# Patient Record
Sex: MALE | URBAN - METROPOLITAN AREA
[De-identification: names, ages, dates, MRNs, and addresses within clinical notes are randomized per-mention and may not be internally consistent; named-entity substitution may affect disease eponyms.]

---

## 2019-08-27 ENCOUNTER — OFFICE VISIT (OUTPATIENT)
Dept: FAMILY MEDICINE CLINIC | Age: 34
End: 2019-08-27

## 2019-08-27 VITALS
RESPIRATION RATE: 18 BRPM | TEMPERATURE: 97.3 F | HEART RATE: 96 BPM | HEIGHT: 65 IN | WEIGHT: 171 LBS | SYSTOLIC BLOOD PRESSURE: 111 MMHG | DIASTOLIC BLOOD PRESSURE: 73 MMHG | BODY MASS INDEX: 28.49 KG/M2 | OXYGEN SATURATION: 97 %

## 2019-08-27 DIAGNOSIS — Z79.4 DIABETES MELLITUS, TYPE II, INSULIN DEPENDENT (HCC): Primary | ICD-10-CM

## 2019-08-27 DIAGNOSIS — E11.9 DIABETES MELLITUS, TYPE II, INSULIN DEPENDENT (HCC): Primary | ICD-10-CM

## 2019-08-27 RX ORDER — METFORMIN HYDROCHLORIDE 1000 MG/1
1000 TABLET ORAL 2 TIMES DAILY WITH MEALS
COMMUNITY
End: 2019-08-27 | Stop reason: SDUPTHER

## 2019-08-27 RX ORDER — LISINOPRIL 10 MG/1
10 TABLET ORAL DAILY
Qty: 90 TAB | Refills: 1 | Status: CANCELLED | OUTPATIENT
Start: 2019-08-27

## 2019-08-27 RX ORDER — LISINOPRIL 10 MG/1
TABLET ORAL DAILY
COMMUNITY
End: 2019-08-27

## 2019-08-27 RX ORDER — METFORMIN HYDROCHLORIDE 1000 MG/1
1000 TABLET ORAL 2 TIMES DAILY WITH MEALS
Qty: 180 TAB | Refills: 1 | Status: SHIPPED | OUTPATIENT
Start: 2019-08-27

## 2019-08-27 NOTE — PROGRESS NOTES
[address] [facility], [place], operated by [facility]    CC: EC for DMII    HPI:     DMII:  -Was diagnosed around 10 years ago  -Reports having had associated symptoms of fatigue, palpitations, polyuria, and polydipsia  -States that at that time he had increased his soda intake due to increase of thirst  -Eventually it was determined he had diabetes  -Reports that his control of diabetes fluctuates from being well controlled to poorly controlled  -Has been trying to follow a low sugar diet  -Reports that he is only taking Novolin N (15 units twice daily)  -Has only been checking his blood sugar at night. No log brought in for review  -Reports his blood sugar at night has been in 200's to 250's  -Reports that he used to also be on metformin (1000 mg BID) and short-acting insulin, but he stopped taking these medications as his prescriptions had ran out  -States it has been over 1 year since he has had any medical care  -Denies any side effects from his diabetic medications  -Does not follow a regular exercise regimen       ROS: Positive items marked in RED  CON: fever, chills  Cardiovascular: palpitations, CP  Resp: SOB, cough  GI: nausea, vomiting, diarrhea  : dysuria, hematuria      Past Medical History:   Diagnosis Date    [facility]        History reviewed. No pertinent surgical history. Family History   Problem Relation Age of Onset    Diabetes Mother     Hypertension Mother     High Cholesterol Mother     Diabetes Father     Hypertension Father     High Cholesterol Father        Social History     Socioeconomic History    Marital status:      Spouse name: Not on file    Number of children: Not on file    Years of education: Not on file    Highest education level: Not on file   Tobacco Use    Smoking status: Current Every Day Smoker     Types: Cigarettes   Substance and Sexual Activity    Alcohol use:  Yes    Sexual activity: Yes     Partners: Female       No Known Allergies      Current Outpatient Medications:      insulin NPH (NOVOLIN N NPH U-100 INSULIN) 100 unit/mL injection, 15 Units by SubCUTAneous route two (2) times a day., Disp: , Rfl:     Physical Exam:      /73 (BP 1 Location: Left arm, BP Patient Position: Sitting)   Pulse 96   Temp 97.3 °F (36.3 °C) (Oral)   Resp 18   Ht 5' 4.5\" (1.638 m)   Wt 171 lb (77.6 kg)   SpO2 97%   BMI 28.90 kg/m²     General: WD, WN, NAD, conversant  Eyes: sclera clear bilaterally, no discharge noted, eyelids normal in appearance  HENT: NCAT, oropharynx clear, MMM  Lungs: CTAB, normal respiratory effort and rate  CV: RRR, no MRGs  ABD: soft, non-tender, non-distended, normal bowel sounds  Ext: no peripheral edema or digital cyanosis noted  Skin: normal temperature, turgor, color, and texture  Psych: alert and oriented to person, place and situation, normal affect  Neuro: speech normal, moving all extremities, gait normal      Assessment/Plan     Insulin-dependent Type 2 Diabetes, Inadequately Controlled:  -Patient counseled on importance that he check his blood sugar more frequently than once a day for his diabetes management  -Given insulin and blood sugar log  -Calculated patient's expected insulin requirement and he is notably under  -Will cautiously increase Novolin N to 20 units BID (Caution being used as patient has not been checking blood sugar at home as recommended)  -Resumed prior metformin regimen  -CBC, CMP, fasting lipid panel, and urine microalbumin/creatinine ordered  -Handouts given on diabetic diet/meal planning/nutrition  -Follow-up in 1 month        Justino King MD  8/27/2019, 12:48 PM

## 2019-08-27 NOTE — PROGRESS NOTES
1. Have you been to the ER, urgent care clinic or hospitalized since your last visit? NO.     2. Have you seen or consulted any other health care providers outside of the [address] [place] [address] since your last visit (Include any pap smears or colon screening)? NO      Do you have an Advanced Directive? NO    Would you like information on Advanced Directives?  NO

## 2019-09-19 ENCOUNTER — HOSPITAL ENCOUNTER (OUTPATIENT)
Dept: LAB | Age: 34
Discharge: HOME | End: 2019-09-19
Payer: SUBSIDIZED

## 2019-09-19 DIAGNOSIS — Z79.4 DIABETES MELLITUS, TYPE II, INSULIN DEPENDENT (HCC): ICD-10-CM

## 2019-09-19 DIAGNOSIS — E11.9 DIABETES MELLITUS, TYPE II, INSULIN DEPENDENT (HCC): ICD-10-CM

## 2019-09-19 LAB
ALBUMIN SERPL-MCNC: 3.9 G/DL (ref 3.4–5)
ALBUMIN/GLOB SERPL: 1.3 {RATIO} (ref 0.8–1.7)
ALP SERPL-CCNC: 160 U/L (ref 45–117)
ALT SERPL-CCNC: 21 U/L (ref 16–61)
ANION GAP SERPL CALC-SCNC: 5 MMOL/L (ref 3–18)
AST SERPL-CCNC: 10 U/L (ref 10–38)
BASOPHILS # BLD: 0 K/UL (ref 0–0.1)
BASOPHILS NFR BLD: 0 % (ref 0–2)
BILIRUB SERPL-MCNC: 0.7 MG/DL (ref 0.2–1)
BUN SERPL-MCNC: 22 MG/DL (ref 7–18)
BUN/CREAT SERPL: 27 (ref 12–20)
CALCIUM SERPL-MCNC: 8.9 MG/DL (ref 8.5–10.1)
CHLORIDE SERPL-SCNC: 103 MMOL/L (ref 100–111)
CHOLEST SERPL-MCNC: 175 MG/DL
CO2 SERPL-SCNC: 29 MMOL/L (ref 21–32)
CREAT SERPL-MCNC: 0.82 MG/DL (ref 0.6–1.3)
CREAT UR-MCNC: 83 MG/DL (ref 30–125)
DIFFERENTIAL METHOD BLD: ABNORMAL
EOSINOPHIL # BLD: 0.1 K/UL (ref 0–0.4)
EOSINOPHIL NFR BLD: 2 % (ref 0–5)
ERYTHROCYTE [DISTWIDTH] IN BLOOD BY AUTOMATED COUNT: 12.9 % (ref 11.6–14.5)
GLOBULIN SER CALC-MCNC: 3.1 G/DL (ref 2–4)
GLUCOSE SERPL-MCNC: 209 MG/DL (ref 74–99)
HCT VFR BLD AUTO: 49.5 % (ref 36–48)
HDLC SERPL-MCNC: 40 MG/DL (ref 40–60)
HDLC SERPL: 4.4 {RATIO} (ref 0–5)
HGB BLD-MCNC: 17 G/DL (ref 13–16)
LDLC SERPL CALC-MCNC: 80.8 MG/DL (ref 0–100)
LIPID PROFILE,FLP: ABNORMAL
LYMPHOCYTES # BLD: 2.2 K/UL (ref 0.9–3.6)
LYMPHOCYTES NFR BLD: 29 % (ref 21–52)
MCH RBC QN AUTO: 32.7 PG (ref 24–34)
MCHC RBC AUTO-ENTMCNC: 34.3 G/DL (ref 31–37)
MCV RBC AUTO: 95.2 FL (ref 74–97)
MICROALBUMIN UR-MCNC: 14.8 MG/DL (ref 0–3)
MICROALBUMIN/CREAT UR-RTO: 178 MG/G (ref 0–30)
MONOCYTES # BLD: 0.3 K/UL (ref 0.05–1.2)
MONOCYTES NFR BLD: 4 % (ref 3–10)
NEUTS SEG # BLD: 4.9 K/UL (ref 1.8–8)
NEUTS SEG NFR BLD: 65 % (ref 40–73)
PLATELET # BLD AUTO: 205 K/UL (ref 135–420)
PMV BLD AUTO: 11.1 FL (ref 9.2–11.8)
POTASSIUM SERPL-SCNC: 4 MMOL/L (ref 3.5–5.5)
PROT SERPL-MCNC: 7 G/DL (ref 6.4–8.2)
RBC # BLD AUTO: 5.2 M/UL (ref 4.7–5.5)
SODIUM SERPL-SCNC: 137 MMOL/L (ref 136–145)
TRIGL SERPL-MCNC: 271 MG/DL (ref ?–150)
VLDLC SERPL CALC-MCNC: 54.2 MG/DL
WBC # BLD AUTO: 7.6 K/UL (ref 4.6–13.2)

## 2019-09-19 PROCEDURE — 80061 LIPID PANEL: CPT

## 2019-09-19 PROCEDURE — 85025 COMPLETE CBC W/AUTO DIFF WBC: CPT

## 2019-09-19 PROCEDURE — 36415 COLL VENOUS BLD VENIPUNCTURE: CPT

## 2019-09-19 PROCEDURE — 80053 COMPREHEN METABOLIC PANEL: CPT

## 2019-09-19 PROCEDURE — 82043 UR ALBUMIN QUANTITATIVE: CPT

## 2019-09-27 ENCOUNTER — OFFICE VISIT (OUTPATIENT)
Dept: FAMILY MEDICINE CLINIC | Age: 34
End: 2019-09-27

## 2019-09-27 VITALS
BODY MASS INDEX: 28.66 KG/M2 | RESPIRATION RATE: 12 BRPM | HEART RATE: 91 BPM | HEIGHT: 65 IN | SYSTOLIC BLOOD PRESSURE: 126 MMHG | OXYGEN SATURATION: 99 % | WEIGHT: 172 LBS | TEMPERATURE: 97.9 F | DIASTOLIC BLOOD PRESSURE: 78 MMHG

## 2019-09-27 DIAGNOSIS — R74.8 ELEVATED ALKALINE PHOSPHATASE LEVEL: ICD-10-CM

## 2019-09-27 DIAGNOSIS — E78.1 HIGH TRIGLYCERIDES: ICD-10-CM

## 2019-09-27 DIAGNOSIS — E11.9 DIABETES MELLITUS, TYPE II, INSULIN DEPENDENT (HCC): Primary | ICD-10-CM

## 2019-09-27 DIAGNOSIS — Z23 ENCOUNTER FOR IMMUNIZATION: ICD-10-CM

## 2019-09-27 DIAGNOSIS — R80.9 MICROALBUMINURIA: ICD-10-CM

## 2019-09-27 DIAGNOSIS — Z79.4 DIABETES MELLITUS, TYPE II, INSULIN DEPENDENT (HCC): Primary | ICD-10-CM

## 2019-09-27 NOTE — PROGRESS NOTES
1. Have you been to the ER, urgent care clinic since your last visit? Hospitalized since your last visit? No    2. Have you seen or consulted any other health care providers outside of the [address] [place] [address] since your last visit? Include any pap smears or colon screening.  No

## 2019-09-27 NOTE — PATIENT INSTRUCTIONS
Proporción albúmina-creatinina: Acerca de esta prueba - [ Albumin-Creatinine Ratio: About This Test ]  ¿Qué es? Shin Prows prueba de proporción albúmina-creatinina compara las cantidades de albúmina y creatinina en la orina. La albúmina se encuentra normalmente en la marie. Cuando los riñones se dañan, se escapan pequeñas cantidades de albúmina a la orina (a veces conocido tyrone microalbuminuria). La creatinina es un producto de desecho que se encuentra en la orina. ¿Por qué se hace esta prueba? Esta prueba ayuda a chapman médico a sarah lo domo que están funcionando rolando riñones. Se hace más a menudo para revisar los riñones de personas con diabetes. También se puede hacer para revisar a personas que tienen presión arterial alek, insuficiencia cardíaca y cirrosis. ¿Cómo puede prepararse para la prueba? · No anjum ejercicio henna antes de la prueba. · Dígale a chapman médico si usted está teniendo chapman período menstrual o tiene secreción vaginal.  · Dígales a rolando médicos TODOS los medicamentos, las vitaminas, los suplementos y los laly herbarios que jose f. Algunos de [facility] [employer] de Connie prueba. ¿Qué ocurre antes de la prueba? · Chapman médico o el laboratorio probablemente le darán el recipiente que necesita para mantener la orina y le darán instrucciones sobre cuándo y cómo recolectar la orina. Podría ser Clara Regal recolección única o gwen recolección best un período de [place]. ¿Qué ocurre best la prueba? Recolección de [place] de gwen garcía vez  · [employer] joshua antes de [place]. · Si el recipiente de recolección tiene tapa, quítela con cuidado y póngala sobre gwen superficie con el lado interno [place]. No toque el interior del recipiente con los dedos. · Límpiese la elizabeth alrededor [employer]. ? Para los hombres: Retraiga el prepucio, si lo tiene, y limpie la graciela del pene con toallitas o hisopos medicinales. ? Para las mujeres: Separe los pliegues de la piel genital con Darral [place]. Luego, use la otra mano para limpiar la elizabeth alrededor de la uretra con toallitas o hisopos medicinales. Limpie la elizabeth de adelante hacia atrás para no esparcir las bacterias del ano por la uretra. · Empiece a orinar en el inodoro o urinario. Si usted es [place], mantenga separados los pliegues de la piel genital [place]. · Gwen vez que la orina haya fluido por varios segundos, coloque el recipiente de recolección en el chorro de orina y recoja alrededor de 2 onzas líquidas (60 mL) de esta orina de \"medio flujo\" sin suspender el chorro de [place]. · No toque el borde del recipiente con la eliazbeth genital. No permita que caiga papel higiénico, vello púbico, heces (materia fecal), marie menstrual ni ninguna otra cosa en la [place]. · Termine de orinar en el inodoro o urinario. · Vuelva a colocar la tapa en el recipiente y [place], y después lleve la muestra al laboratorio. Si está recolectando la orina en casa y no puede llevarla al laboratorio en ofelia [place]ala. Recolección de [employer] un período de tiempo  Usted recolecta chapman orina best un período específico de tiempo, por ejemplo, best 4 o 24 horas. · Empiece la recolección de orina en la mañana. En cuanto se levante, vacíe la vejiga, george no guarde gen orina. Anote la hora en que orinó para marcar el comienzo de chapman período de recolección. · Best el período de tiempo establecido, recolecte toda chapman orina. Orine en un recipiente pequeño y limpio tyrone se indica en las instrucciones de [place], y luego vierta la orina en el recipiente angel. No toque el interior de malini envase con los dedos. Conserve el recipiente angel en el refrigerador. · Vacíe la vejiga por última vez al final o henna antes del final del período de recolección. Añada esta orina al recipiente angel y anote la hora. ¿Qué más debe saber acerca de la prueba? · Mariah resultados incluirán gwen explicación de lo que es un resultado \"normal\".  [place] se llama \"límites de referencia\". Es solo gwen Karin Walker. Chapman médico evaluará rolando resultados en función de chapman rich y otros factores. [place] significa que un valor que  fuera de los valores normales enumerados, aún puede ser normal para usted. · Si rolando resultados son más altos de lo normal, chapman médico puede examinar chapman orina con más frecuencia para vigilar el daño renal.  · Si chapman prueba indica que es posible que tenga daño renal, se pueden llevar a cabo otras pruebas. ¿Qué ocurre después de la prueba? · Siga las instrucciones de chapman médico para llevar la orina al consultorio del médico o laboratorio. · Puede volver a rolando actividades habituales inmediatamente. La atención de seguimiento es gwen parte clave de chapman tratamiento y seguridad. Asegúrese de hacer y acudir a todas las citas, y llame a chapman médico si está teniendo problemas. También es gwen buena idea mantener gwen lista de los medicamentos que jose f. Pregúntele a chapman médico cuándo puede esperar [employer] de la prueba. ¿Dónde puede encontrar más información en inglés? Jordy Mow a http://ann-ron.info/. Julieth Meera [email or web address] en la búsqueda para aprender más acerca de \"Proporción albúmina-creatinina: Acerca de esta prueba - [ Albumin-Creatinine Ratio: About This Test ]. \"  Revisado:  2018  Versión del contenido: 12.2  © 7738-9877 M[employer], [employer]. Las instrucciones de cuidado fueron adaptadas bajo licencia por [employer] (which disclaims liability or warranty for this information). Si usted tiene [place] afección médica o sobre estas instrucciones, siempre pregunte a chapman profesional de rich. [employer] niega toda garantía o responsabilidad por chapman uso de esta información.

## 2019-09-27 NOTE — PROGRESS NOTES
[facility]    CC: F/U for chronic disease management    HPI:     Elevated Alkaline Phosphatase:   -Denies any prior Hx of liver disease  -No regular use of acetaminophen  -Drinks 1-2 beers a month      DMII:  -Taking diabetic medication as prescribed  -Denies any side effect or issue with medication  -Exercising 1-2 times a week for an hour  -Has been eating less rice and sugar  -Checking blood sugar. Log brought in for review.  -Home FBS range of 120-176  -6 out of 24 readings at goal of less than 130  -Average FBS of 147      High Triglycerides:  -On no lipid lowering medication  -Denied prior diagnosis  -Exercising 1-2 times a week for an hour  -Has been eating less rice and sugar      ROS: Positive items marked in RED  CON: fever, chills  Cardiovascular: palpitations, CP  Resp: SOB, cough  GI: nausea, vomiting, diarrhea  : dysuria, hematuria      Past Medical History:   Diagnosis Date    [facility]     Nicotine dependence        History reviewed. No pertinent surgical history. Family History   Problem Relation Age of Onset    Diabetes Mother     Hypertension Mother     High Cholesterol Mother     Diabetes Father     Hypertension Father     High Cholesterol Father        Social History     Socioeconomic History    Marital status: SINGLE     Spouse name: Not on file    Number of children: Not on file    Years of education: Not on file    Highest education level: Not on file   Tobacco Use    Smoking status: Current Every Day Smoker     Types: Cigarettes    Smokeless tobacco: Never Used   Substance and Sexual Activity    Alcohol use: Yes    Sexual activity: Yes     Partners: Female       No Known Allergies      Current Outpatient Medications:     metFORMIN (GLUCOPHAGE) 1,000 mg tablet, Take 1 Tab by mouth two (2) times daily (with meals). , Disp: 180 Tab, Rfl: 1    insulin NPH (NOVOLIN N NPH U-100 INSULIN) 100 unit/mL injection, 20 Units by SubCUTAneous route two (2) times a day., Disp: 3 Vial, Rfl: 1    ibuprofen (MOTRIN) 600 mg tablet, Take 1 Tab by mouth every six (6) hours as needed for Pain., Disp: 20 Tab, Rfl: 0    Physical Exam:      /78   Pulse 91   Temp 97.9 °F (36.6 °C) (Oral)   Resp 12   Ht 5' 4.5\" (1.638 m)   Wt 172 lb (78 kg)   SpO2 99%   BMI 29.07 kg/m²     General:  WD, WN, NAD, conversant  Eyes: sclera clear bilaterally, no discharge noted, eyelids normal in appearance  HENT: NCAT  Lungs: CTAB, normal respiratory effort and rate  CV: RRR, no MRGs  ABD: soft, non-tender, non-distended, normal bowel sounds  Skin: normal temperature, turgor, color, and texture  Psych: alert and oriented to person, place and situation, normal affect  Neuro: speech normal, moving all extremities, gait normal    Results for Hung Echeverria (MRN [id number]):   Ref. Range 9/19/2019 08:58   WBC Latest Ref Range: 4.6 - 13.2 K/uL 7.6   RBC Latest Ref Range: 4.70 - 5.50 M/uL 5.20   HGB Latest Ref Range: 13.0 - 16.0 g/dL 17.0 (H)   HCT Latest Ref Range: 36.0 - 48.0 % 49.5 (H)   MCV Latest Ref Range: 74.0 - 97.0 FL 95.2   MCH Latest Ref Range: 24.0 - 34.0 PG 32.7   MCHC Latest Ref Range: 31.0 - 37.0 g/dL 34.3   RDW Latest Ref Range: 11.6 - 14.5 % 12.9   PLATELET Latest Ref Range: 135 - 420 K/uL 205   MPV Latest Ref Range: 9.2 - 11.8 FL 11.1   NEUTROPHILS Latest Ref Range: 40 - 73 % 65   LYMPHOCYTES Latest Ref Range: 21 - 52 % 29   MONOCYTES Latest Ref Range: 3 - 10 % 4   EOSINOPHILS Latest Ref Range: 0 - 5 % 2   BASOPHILS Latest Ref Range: 0 - 2 % 0   DF Latest Units:   AUTOMATED   ABS. NEUTROPHILS Latest Ref Range: 1.8 - 8.0 K/UL 4.9   ABS. LYMPHOCYTES Latest Ref Range: 0.9 - 3.6 K/UL 2.2   ABS. MONOCYTES Latest Ref Range: 0.05 - 1.2 K/UL 0.3   ABS. EOSINOPHILS Latest Ref Range: 0.0 - 0.4 K/UL 0.1   ABS.  BASOPHILS Latest Ref Range: 0.0 - 0.1 K/UL 0.0   Sodium Latest Ref Range: 136 - 145 mmol/L 137   Potassium Latest Ref Range: 3.5 - 5.5 mmol/L 4.0   Chloride Latest Ref Range: 100 - 111 mmol/L 103   CO2 Latest Ref Range: 21 - 32 mmol/L 29   Anion gap Latest Ref Range: 3.0 - 18 mmol/L 5   Glucose Latest Ref Range: 74 - 99 mg/dL 209 (H)   BUN Latest Ref Range: 7.0 - 18 MG/DL 22 (H)   Creatinine Latest Ref Range: 0.6 - 1.3 MG/DL 0.82   BUN/Creatinine ratio Latest Ref Range: 12 - 20   27 (H)   Calcium Latest Ref Range: 8.5 - 10.1 MG/DL 8.9   GFR est non-AA Latest Ref Range: >60 ml/min/1.73m2 >60   GFR est AA Latest Ref Range: >60 ml/min/1.73m2 >60   Bilirubin, total Latest Ref Range: 0.2 - 1.0 MG/DL 0.7   Protein, total Latest Ref Range: 6.4 - 8.2 g/dL 7.0   Albumin Latest Ref Range: 3.4 - 5.0 g/dL 3.9   Globulin Latest Ref Range: 2.0 - 4.0 g/dL 3.1   A-G Ratio Latest Ref Range: 0.8 - 1.7   1.3   ALT (SGPT) Latest Ref Range: 16 - 61 U/L 21   AST Latest Ref Range: 10 - 38 U/L 10   Alk. phosphatase Latest Ref Range: 45 - 117 U/L 160 (H)   Triglyceride Latest Ref Range: <150 MG/ (H)   Cholesterol, total Latest Ref Range: <200 MG/   HDL Cholesterol Latest Ref Range: 40 - 60 MG/DL 40   CHOL/HDL Ratio Latest Ref Range: 0 - 5.0   4.4   LDL, calculated Latest Ref Range: 0 - 100 MG/DL 80.8   VLDL, calculated Latest Units: MG/DL 54.2   Creatinine, urine Latest Ref Range: 30 - 125 mg/dL 83.00   Microalbumin,urine random Latest Ref Range: 0 - 3.0 MG/DL 14.80 (H)   Microalbumin/Creat.  Ratio Latest Ref Range: 0 - 30 mg/g 178 (H)       Assessment/Plan     DMII, Improving:  -Will increase PM dose of insulin to 22 units  -Plan for diabetic foot exam and HGBA1c at next visit  -F/U in 2 months       Elevated Alkaline Phosphatase:   -Suspect it is 2/2 fatty liver  -Counseled and diagnosis, suspected cause, and recommended lifestyle changes  -Patient declined any further work up at this time due to cost and not having any insurance  -F/U in 2 months       High Triglycerides:  -Patient counseled on diagnosis and recommended lifestyle changes  -Patient declined to start on any statin therapy  -F/U in 2 months       Microalbuminuria:  -Likely 2/2 DMII  -Patient counseled on test result  -Plan for repeat urine microalbumin/creatinine in 3-6 months  -Handout given on albumin-creatinine ratio  -F/U in 2 months       Health Maintenance:  -Flu shot given        Zonia Jacob MD  9/27/2019, 8:13 AM

## 2021-01-01 NOTE — PATIENT INSTRUCTIONS
Aprenda acerca de las pautas alimentarias para diabetes - [ Learning About Diabetes Food Guidelines ]  Instrucciones de cuidado    La planificación de las comidas es importante para el manejo de la diabetes. Ayuda a mantener el azúcar en la marie en el nivel ideal (que usted fija con chapman médico). Usted no tiene que comer alimentos especiales. Puede comer lo mismo que chapman lolly, incluso dulces de vez en cuando. Mandy debe prestar atención a la cantidad y la frecuencia con que come ciertos alimentos. Syed vez desee colaborar con un dietista o educador de diabetes certificado (CDE, por rolando siglas en inglés) para que le ayuden a planificar las comidas y los refrigerios. Un dietista o CDE también puede ayudarle a bajar de peso si sebastián es nicole de rolando objetivos. ¿Qué debería saber acerca de ingerir carbohidratos? Manejar la cantidad de carbohidratos que ingiere es gwen parte importante de la alimentación saludable cuando tiene diabetes. Los carbohidratos se encuentran en muchos alimentos. · Sepa qué alimentos contienen carbohidratos. Y aprenda qué cantidad de carbohidratos contienen los diferentes alimentos. ? El pan, los cereales, la pasta y el arroz tienen aproximadamente 15 gramos de carbohidratos por porción. Gwen porción equivale a 1 rebanada de pan (1 onza o 28 g), ½ taza de cereal cocido o 1/3 de taza de pasta o arroz cocidos. ? Las frutas tienen 15 gramos de carbohidratos por porción. Jane Dy porción es 1 fruta fresca pequeña, tyrone gwen [place] fab[place] o gwen naranja; ½ banana (plátano); ½ taza de fruta cocida o enlatada; ½ taza de jugo de fruta; 1 taza de melón o frambuesas; o 2 cucharadas de frutas secas. ? Jonny Raddle y el yogur sin azúcar agregado tienen 15 gramos de carbohidratos por porción. Jane Dy porción es 1 taza de Elinore Putt o 2/3 taza de yogur sin azúcar agregado. ? Las verduras con almidón tienen 15 gramos de carbohidratos por porción.  Jane Dy porción es ½ taza de puré de papa o camote (batata, ursula); 1 taza de calabacín; ½ papa horneada pequeña; ½ taza de frijoles cocidos; o ½ taza de maíz (elote) o arvejas (chícharos) cocidos. · Aprenda cuántos carbohidratos debe consumir cada día y en cada comida. Un dietista o CDE le puede enseñar cómo llevar la cuenta de los carbohidratos que consume. A esto se le llama recuento de carbohidratos. · Si no está seguro de cómo [address] gramos de carbohidratos, utilice el [place] para planificar las comidas. Es gwen Laddie Brod y rápida de asegurarse de que consuma comidas equilibradas. También le ayuda a distribuir los carbohidratos best el día. ? Divida el plato por tipo de alimento. Llene medio plato con verduras sin almidón, ponga carne u otras proteínas en gwen cuarta parte del plato y granos o verduras con almidón en el último cuarto del plato. A esto puede agregarle un pequeño pedazo de fruta y 3 taza de [place] o yogur, según la cantidad de carbohidratos que deba consumir en gwen comida. · Trate de comer aproximadamente la misma cantidad de carbohidratos en cada comida. No \"reserve\" chapman cantidad diaria de carbohidratos para consumirlos en gwen garcía comida. · Las proteínas contienen muy pocos o nada de carbohidratos por porción. Los ejemplos de proteínas incluyen carne de res, [place], [place], [place], [place], tofu, Juan-barre, requesón (\"cottage cheese\") y la mantequilla de cacahuate [place]). Gwen porción de carne son 3 onzas (85 g), lo cual es aproximadamente del tamaño de gwen baraja de naipes. Los ejemplos de porciones de sustitutos de la carne (equivalente a 1 onza o 28 g de carne) son 1/4 de taza de requesón, 1 huevo, 1 cucharada de [place] de cacahuate y ½ taza de tofu. ¿Cómo puede comer fuera y aún así comer de modo saludable? · Aprenda a calcular los tamaños de las porciones de alimentos que contienen carbohidratos. Si mide la comida en casa, será más fácil calcular la cantidad en gwen porción de comida de restaurante.   · Si el platillo que pide contiene demasiados carbohidratos (tyrone moody, maíz o frijoles al horno), pida un alimento bajo en carbohidratos en rendon lugar. Pida gwen ensalada o verduras. · Si [place] insulina, revise rendon azúcar en la marie antes y después de comer fuera para ayudarle a planear cuánto comer en el futuro. · Si usted come más carbohidratos de lo planeado en gwen comida, dé un paseo o anjum otro tipo de ejercicio. [place] ayudará a reducir el azúcar en la marie. ¿Qué más debería saber? · Limite las grasas saturadas, tyrone la grasa de la carne y productos lácteos. Esta es gwen opción saludable, porque las personas que tienen diabetes tienen un mayor riesgo de enfermedades del corazón. Así que elija sanders magros de carne y productos lácteos descremados o semidescremados. Utilice aceite de [place]ill[place] o de canola en lugar de mantequilla o manteca al cocinar. · No se salte comidas. Rendon nivel de azúcar en la marie puede bajar demasiado si usted se salta comidas y jose f insulina o ciertos medicamentos para la diabetes. · Consulte con rendon médico antes de beber alcohol. El alcohol puede hacer que rendon azúcar en la marie baje demasiado. El alcohol también puede causar gwen reacción adversa si usted jose f ciertos medicamentos para la diabetes. La atención de seguimiento es gwen parte clave de rendon tratamiento y seguridad. Asegúrese de hacer y acudir a todas las citas, y llame a rendon médico si está teniendo problemas. También es gwen buena idea saber los resultados de rolando exámenes y mantener gwen lista de los medicamentos que jose f. ¿Dónde puede encontrar más información en inglés? Cherie Brooks a http://ann-ron.info/. Brice Norberto X160 en la búsqueda para aprender más acerca de \"Aprenda acerca de las pautas alimentarias para diabetes - [ Learning About Diabetes Food Guidelines ]. \"  Revisado: 25 julio, 2018  Versión del contenido: 12.1  © 3121-5085 Healthwise, Incorporated.  Las instrucciones de cuidado fueron adaptadas bajo licencia por [employer] (which disclaims liability or warranty for this information). Si usted tiene [place] afección médica o sobre estas instrucciones, siempre pregunte a chapman profesional de rich. [facility] niega toda garantía o responsabilidad por chapman uso de esta información. Aprenda acerca de la planificación de comidas para la diabetes - [ Learning About Meal Planning for Diabetes ]  ¿Por qué planificar las comidas? La planificación de comidas puede ser [place] parte crucial del manejo de la diabetes. Planificar las comidas y los refrigerios con el equilibrio correcto de carbohidratos, proteínas y grasas puede ayudarle a mantener los niveles de azúcar en la marie dentro de los límites ideales que estableció junto con chapman médico.  No tiene que comer alimentos especiales. Puede comer lo mismo que chapman lolly, incluso dulces de vez en cuando. Mandy debe prestar atención a la cantidad y la frecuencia con que come ciertos alimentos. Syed vez desee colaborar con un dietista o un educador en diabetes certificado. Él o rajani puede darle consejos y sugerencias de comidas y puede responder a rolando preguntas sobre la planificación de comidas. Alessandra profesional sanitario también puede ayudarle a alcanzar un peso saludable si sebastián es nicole de rolando objetivos. ¿Qué plan es adecuado para usted? Chapman dietista o educador en diabetes puede sugerirle que empiece con el formato de plato o el recuento de carbohidratos. Formato de plato  El formato de plato es gwen manera sencilla de ayudarle a controlar la manera en que se [place]. Usted planifica rolando comidas aprendiendo a sarah la cantidad de espacio que cada alimento debería ocupar en un plato. Usar el formato de plato le ayuda a distribuir los carbohidratos best el día. Puede hacer que le resulte más fácil mantener el nivel de azúcar en la marie dentro de los límites ideales. También le ayuda a sarah si está comiendo porciones de un tamaño saludable.   Para usar el formato de [place] 1 la mitad del plato con verduras sin almidón. Añada carne o sustitutos de carne en un cuarto del plato. Ponga gwen verdura con almidón o un grano (tyrone arroz integral o gwen papa) en el último cuarto del plato. Puede agregar Cathleen Shawl pequeña pieza de fruta y [address] o yogur descremado o semidescremado, dependiendo de chapman meta de carbohidratos para cada comida. Estos son algunos consejos para usar el formato de plato:  · Asegúrese de no estar usando un plato demasiado angel. Lo mejor es usar un plato de 9 pulgadas (23 cm). Muchos restaurantes usan platos más grandes. · Acostúmbrese a usar el formato de plato en casa. De sebastián modo puede luego usarlo cuando coma afuera. · Anote las preguntas que tenga acerca de usar el formato de [place]. Hable con chapman médico, dietista o educador en diabetes sobre rolando inquietudes. Recuento de carbohidratos  Con el recuento de carbohidratos, usted planifica las comidas de acuerdo a la cantidad de carbohidratos en cada alimento. Los carbohidratos aumentan el nivel de azúcar en la [place] y con mayor rapidez que otros nutrientes. Se encuentran en postres, panes y cereales y en la fruta. También se encuentran en las verduras con almidón, tales tyrone las moody y [place], los granos tyrone el arroz y la pasta, así tyrone en la [place] y el yogur. Distribuir el consumo de carbohidratos a lo nimisha del día le ayuda a mantener el azúcar en la marie en los límites ideales. Chapman cantidad diaria depende de varios factores, incluyendo chapman peso, nivel de [place], los [place] jose f para la diabetes y los objetivos que tenga para rolando niveles de azúcar en la marie. Un dietista registrado o un educador en diabetes puede ayudarle a planear cuántos carbohidratos incluir en cada comida y refrigerio. Gwen guía para la cantidad diaria de carbohidratos es:  · Entre 39 y 61 gramos en cada comida. Eso equivale a 3 o 4 porciones de carbohidratos, aproximadamente. · Entre 15 y 21 gramos para cada refrigerio. Eso equivale a 1 porción de carbohidratos, aproximadamente. La etiqueta nutricional de los alimentos envasados le indica la cantidad de carbohidratos que hay en gwen porción de sebastián alimento. Kym, bozena cuál es el tamaño de la porción que indica la [place]. ¿Es gen porción la cantidad que usted come de gwen vez? Toda la información nutricional en gwen etiqueta se basa en el tamaño de la porción. Así que si usted come [facility] mayor o galindo cantidad, tendrá [employer] cifras. La cantidad total de carbohidratos es lo siguiente que debe buscar en la etiqueta. Si cuenta las porciones de carbohidratos, gwen porción de carbohidratos equivale a 15 gramos. Para los alimentos que no tienen etiquetas, tyrone las frutas y las verduras frescas, usted necesitará gwen guía que enumere la cantidad de carbohidratos que contienen esos alimentos. Pregúntele a chapman médico, dietista o educador en diabetes acerca de libros o guías de nutrición que [employer]. Si [place] insulina, necesita saber cuántos gramos de carbohidratos hay en gwen comida. [place] le permite saber cuánta insulina de acción rápida necesita antes de comer. Si [place] gwen bomba de [place], usted recibe [facility] cantidad josé de insulina a lo nimisha del día. Por lo tanto, debe programar la bomba en las comidas para que le suministre insulina adicional a fin de cubrir el aumento del azúcar en la marie después de las comidas. Cuando usted sabe qué cantidad de carbohidratos consumirá, puede programar la cantidad correcta de [place]. O si [place] siempre la misma dosis de insulina, tendrá que asegurarse de consumir la misma cantidad de carbohidratos en cada comida. Si necesita ayuda adicional para comprender el recuento de carbohidratos y las etiquetas de nutrición, pregúntele a chapman médico, dietista o educador en diabetes. ¿Cómo puede empezar a usar la planificación de comidas? Estos son algunos consejos para empezar:  · Planifique las comidas para toda la semana por anticipado. No se olvide de incluir los refrigerios. · Use libros de cocina o recetas en Internet para planificar varias comidas principales. Planifique algunas comidas rápidas para las noches en las que esté ocupado. También puede cocinar el doble de algunas comidas que se puedan congelar. Puede guardar la mitad para otras noches en las que esté ocupado y no tenga tiempo para cocinar. · Asegúrese de que tenga los ingredientes que necesita para rolando recetas. Si tiene poca cantidad de algunos artículos básicos, añádalos a la lista de la compra. · Jaky gwen lista de alimentos que [place] para preparar desayunos, almuerzos y refrigerios. Anote cantidades abundantes de frutas y verduras. · Coloque esta lista en la renuka del refrigerador. Siga añadiendo cosas según se le Domenic Bigness ocurriendo. · Lleve la lista consigo cuando vaya a hacer las compras semanales. La atención de seguimiento es gwen parte clave de chapman tratamiento y seguridad. Asegúrese de hacer y acudir a todas las citas, y llame a chapman médico si está teniendo problemas. También es gwen buena idea saber los resultados de rolando exámenes y mantener gwen lista de los medicamentos que jose f. ¿Dónde puede encontrar más información en inglés? Floyd Johnson a http://ann-ron.info/. Jacqui Kellogg en la búsqueda para aprender más acerca de \"Aprenda acerca de la planificación de comidas para la diabetes - [ Learning About Meal Planning for Diabetes ]. \"  Revisado: 25 julio, 2018  Versión del contenido: 12.1  © 9799-6454 Healthwise, Incorporated. Las instrucciones de cuidado fueron adaptadas bajo licencia por [employer] (which disclaims liability or warranty for this information). Si usted tiene [place] afección médica o sobre estas instrucciones, siempre pregunte a chapman profesional de rich. [facility] niega toda garantía o responsabilidad por chapman uso de esta información.       Consejos de nutrición para la diabetes: Después de la consulta  [Nutrition Tips for Diabetes: After Your Visit]  Instrucciones de cuidado  Gwen dieta saludable es importante para el manejo de la diabetes. Puede ayudarle a bajar de peso (si lo necesita) y a no recuperarlo. Esta dieta le da los nutrientes y la energía que chapman cuerpo necesita y le ayuda a prevenir enfermedades cardíacas (del corazón). Sin embargo, esto no significa que en gwen dieta para personas con diabetes usted tenga que consumir alimentos especiales. Usted [employer] mismos alimentos que chapman lolly, incluso dulces ocasionalmente y otros alimentos favoritos. Mandy debe tener en cuenta la cantidad y la frecuencia con que come ciertos alimentos. El plan correcto para usted incluirá alimentos que le ayuden a mantener chapman nivel de azúcar en la marie en límites sanos. Trate de comer alimentos variados y [employer] carbohidratos best todo el día. Los carbohidratos aumentan el nivel de azúcar en la marie y lo hacen con mayor rapidez que otros nutrientes. Estos pueden encontrarse en el azúcar, los panes y cereales, las frutas, en los vegetales con almidones, tyrone las moody y [place], y en la [place] y el yogur. Sería conveniente que colabore con un dietista o educador de diabetes para que le ayuden a planificar las comidas y los refrigerios. Si nicole de rolando objetivos es la pérdida de [place], un dietista o educador de diabetes puede ayudarle. Los consejos que siguen a continuación pueden ayudarle a disfrutar rolando comidas y [employer]. La atención de seguimiento es gwen parte clave de chapman tratamiento y seguridad. Asegúrese de hacer y acudir a todas las citas, y llame a chapman médico si está teniendo problemas. También es gwen buena idea saber los resultados de los exámenes y mantener gwen lista de los medicamentos que jose f. ¿Cómo puede cuidarse en el hogar? · Aprenda a distinguir los alimentos con carbohidratos y la cantidad de carbohidratos que debe consumir.  Un dietista o educador de diabetes puede enseñarle a llevar un control de la cantidad de carbohidratos que consume. · Distribuya los carbohidratos a lo nimisha del día. Consuma gwen pequeña porción de carbohidratos en cada gwen de las comidas, george no demasiado de gwen garcía vez. · Planifique rolando comidas para que incluyan alimentos de todos los grupos alimentarios. Estos son Malik Blades y algunos ejemplos de tamaños de porciones:  ¨ Granos: 1 rebanada de pan (1 onza o 28 g), ½ taza de cereal cocido y 1/3 de taza de pasta o arroz cocidos. Estas raciones contienen alrededor de 15 gramos de carbohidratos por porción. Elija comer granos enteros, tyrone pan o galletas saladas integrales, denzel y Brenda Pole integral, con mayor frecuencia que granos refinados. ¨ Frutas: 1 fruta pequeña fresca, tyrone gwen manzana o gwen naranja; ½ banana (plátano); ½ taza de fruta picada, cocida o enlatada; ½ taza de jugo de fruta; 1 taza de melón o frambuesa; y 2 cucharadas de frutas secas. Estas raciones contienen alrededor de 15 gramos de carbohidratos por porción. ¨ Productos lácteos: 1 taza de [address] y 2/3 de taza de yogur natural. Estas raciones contienen alrededor de 15 gramos de carbohidratos por porción. ¨ Proteínas: Carne de luz, gretchen, pavo, pescado, huevos, tofu, queso, requesón y [address] de cacahuate [place]). Gwen porción de carne es 3 onzas, que es aproximadamente el tamaño de gwen baraja de naipes. Ejemplos de porciones de sustitutos de la carne (igual a 1 onza de carne) son 1/4 de taza de requesón, 1 huevo, 1 cucharada de [address] de cacahuate y ½ taza de tofu. Estos alimentos contienen muy poca cantidad o nada de carbohidratos por porción. ¨ Vegetales: Los vegetales con almidón tyrone ½ taza de frijoles (habichuelas) secos cocidos, arvejas (chícharos), moody o maíz contienen alrededor de 15 gramos de carbohidratos.  Los vegetales sin almidón contienen muy pocos carbohidratos, tyrone 1 taza de verduras de hojas verdes crudas (tyrone la espinaca), ½ taza de otro vegetal (cocido o larissa) y 3/4 de taza de jugo de verduras. · Use el formato del plato para planificar rolando comidas. Es gwen Mariah Cap y rápida de asegurarse de que consuma comidas balanceadas. También le ayuda a distribuir los carbohidratos best el día. Divida el plato por tipo de alimento. [address] [place] verduras en medio plato, la carne o rolando sustitutos en un cuarto del plato y los granos o verduras con almidones (tyrone arroz integral o gwen papa) en la cuarta parte restante del plato. A esto puede agregarle un pequeño trozo de fruta y [place] taza de [place] o yogur, según la cantidad de carbohidratos que deba consumir en gwen comida. · Consulte a chapman dietista o educador de diabetes sobre las formas de añadir cantidades limitadas de dulces en chapman plan alimenticio. Usted puede comer estos alimentos de vez en cuando, siempre que incluya la cantidad de carbohidratos que contienen en la cantidad permitida diaria. · Si amos alcohol, limite el consumo a no más que 1 bebida al día si es yanci y 2 bebidas al día si es hombre. Si está embarazada, ninguna cantidad de alcohol es calvillo. · Las proteínas, grasas y fibras no aumentan tanto el nivel de azúcar en la marie tyrone los carbohidratos. Si consume muchos de estos nutrientes en gwen comida, el azúcar en la marie aumentará con mayor lentitud de lo que lo haría de Shashank u [place]. · Limite las grasas saturadas, tyrone las de las cb y los productos lácteos. Trate de reemplazarlos con grasa monoinsaturada tyrone, por ejemplo, el aceite de [place]. Esta es gwen opción más saludable porque las personas con diabetes tienen un riesgo superior al promedio de enfermedad cardíaca. De todos modos, use gwen cantidad [facility] [place]. Gwen cucharada de aceite de [place] contiene 14 gramos de grasa y 120 calorías. · Hacer ejercicio disminuye el nivel de azúcar en la marie.  Si usted se [place]-Ishmael dosis de [employer] de inyeiones o [facility] Irina gamboa usar gwen dosis galindo que si no hace ejercicio. Tenga en cuenta que los horarios son importantes. Si usted hace ejercicio 1 hora después de alejandra comido, es posible que chapman cuerpo necesite menos insulina que si lo hace 3 horas después de la comida. Mida chapman nivel de azúcar en la marie para saber cómo afecta el ejercicio chapman necesidad de insulina. · Anjum ejercicio la mayoría de los días de la [place]. Anjum por lo menos 30 minutos al día. El ejercicio le ayuda a mantener un peso saludable y a que chapman cuerpo use la insulina. Caminar es gwen manera fácil de hacer ejercicio. Aumente en forma gradual la distancia que camina cada día. Quizás también desee nadar, montar en bicicleta o hacer otras actividades. Cuando come afuera  · Aprenda a [employer] de las porciones de alimentos que contienen carbohidratos. Si mide rolando alimentos en el hogar, será más fácil calcular la cantidad de carbohidratos en la porción del restaurante. · Si la comida que pide contiene demasiados carbohidratos (tyrone moody, maíz o frijoles horneados), pida en chapman lugar gwen comida baja en carbohidratos. Pida gwen ensalada o vegetales verdes. · Si [place] insulina, mídase el nivel de azúcar antes y después de salir a comer afuera para saber la cantidad que puede comer en el futuro. · Si consume más carbohidratos de lo planeado en gwen comida, camine o anjum ejercicio. [place] le ayudará a bajar el nivel de azúcar en la marie. ¿Dónde puede encontrar más información en inglés? Christina Veto a Deal[employer]orer[employer]be  Shearon Ask [id number] en la búsqueda para aprender más acerca de \"Consejos de nutrición para la diabetes: Después de la consulta. \"   © 9400-4426 Healthwise, Incorporated. Instrucciones de cuidado adaptadas bajo licencia por [employer] (which disclaims liability or warranty for this information). Estas instrucciones de cuidado son para usarlas con chapman profesional clínico registrado.  Si tiene preguntas acerca de gwen afección médica o de estas instrucciones, pregunte siempre a chapman profesional de [employer]. Healthwise, Incorporated niega cualquier garantía o responsabilidad por chapman uso de esta información.   Versión del contenido: [id number]; Revisado: 6 agosto, 2013